# Patient Record
Sex: FEMALE | Race: BLACK OR AFRICAN AMERICAN | NOT HISPANIC OR LATINO
[De-identification: names, ages, dates, MRNs, and addresses within clinical notes are randomized per-mention and may not be internally consistent; named-entity substitution may affect disease eponyms.]

---

## 2018-01-08 ENCOUNTER — RESULT REVIEW (OUTPATIENT)
Age: 33
End: 2018-01-08

## 2018-01-31 ENCOUNTER — RESULT REVIEW (OUTPATIENT)
Age: 33
End: 2018-01-31

## 2018-11-14 ENCOUNTER — EMERGENCY (EMERGENCY)
Facility: HOSPITAL | Age: 33
LOS: 1 days | Discharge: HOME | End: 2018-11-14
Attending: EMERGENCY MEDICINE
Payer: COMMERCIAL

## 2018-11-14 VITALS
RESPIRATION RATE: 20 BRPM | DIASTOLIC BLOOD PRESSURE: 70 MMHG | SYSTOLIC BLOOD PRESSURE: 115 MMHG | HEART RATE: 66 BPM | OXYGEN SATURATION: 99 %

## 2018-11-14 VITALS
OXYGEN SATURATION: 100 % | RESPIRATION RATE: 16 BRPM | DIASTOLIC BLOOD PRESSURE: 67 MMHG | SYSTOLIC BLOOD PRESSURE: 113 MMHG | TEMPERATURE: 98 F | HEART RATE: 69 BPM

## 2018-11-14 DIAGNOSIS — N83.209 UNSPECIFIED OVARIAN CYST, UNSPECIFIED SIDE: ICD-10-CM

## 2018-11-14 DIAGNOSIS — Z34.90 ENCOUNTER FOR SUPERVISION OF NORMAL PREGNANCY, UNSPECIFIED, UNSPECIFIED TRIMESTER: ICD-10-CM

## 2018-11-14 LAB
ALBUMIN SERPL ELPH-MCNC: 4 G/DL — SIGNIFICANT CHANGE UP (ref 3.5–5)
ALP SERPL-CCNC: 39 U/L — LOW (ref 40–120)
ALT FLD-CCNC: 16 U/L DA — SIGNIFICANT CHANGE UP (ref 10–60)
ANION GAP SERPL CALC-SCNC: 8 MMOL/L — SIGNIFICANT CHANGE UP (ref 5–17)
APPEARANCE UR: CLEAR — SIGNIFICANT CHANGE UP
APTT BLD: 26.9 SEC — LOW (ref 27.5–36.3)
AST SERPL-CCNC: 16 U/L — SIGNIFICANT CHANGE UP (ref 10–40)
BASOPHILS # BLD AUTO: 0.1 K/UL — SIGNIFICANT CHANGE UP (ref 0–0.2)
BASOPHILS NFR BLD AUTO: 1.1 % — SIGNIFICANT CHANGE UP (ref 0–2)
BILIRUB SERPL-MCNC: 0.2 MG/DL — SIGNIFICANT CHANGE UP (ref 0.2–1.2)
BILIRUB UR-MCNC: NEGATIVE — SIGNIFICANT CHANGE UP
BUN SERPL-MCNC: 12 MG/DL — SIGNIFICANT CHANGE UP (ref 7–18)
CALCIUM SERPL-MCNC: 8.8 MG/DL — SIGNIFICANT CHANGE UP (ref 8.4–10.5)
CHLORIDE SERPL-SCNC: 107 MMOL/L — SIGNIFICANT CHANGE UP (ref 96–108)
CO2 SERPL-SCNC: 24 MMOL/L — SIGNIFICANT CHANGE UP (ref 22–31)
COLOR SPEC: YELLOW — SIGNIFICANT CHANGE UP
CREAT SERPL-MCNC: 0.7 MG/DL — SIGNIFICANT CHANGE UP (ref 0.5–1.3)
DIFF PNL FLD: NEGATIVE — SIGNIFICANT CHANGE UP
EOSINOPHIL # BLD AUTO: 0.1 K/UL — SIGNIFICANT CHANGE UP (ref 0–0.5)
EOSINOPHIL NFR BLD AUTO: 1.4 % — SIGNIFICANT CHANGE UP (ref 0–6)
GLUCOSE SERPL-MCNC: 79 MG/DL — SIGNIFICANT CHANGE UP (ref 70–99)
GLUCOSE UR QL: NEGATIVE — SIGNIFICANT CHANGE UP
HCG SERPL-ACNC: 1314 MIU/ML — HIGH
HCT VFR BLD CALC: 34.8 % — SIGNIFICANT CHANGE UP (ref 34.5–45)
HGB BLD-MCNC: 11 G/DL — LOW (ref 11.5–15.5)
INR BLD: 1.11 RATIO — SIGNIFICANT CHANGE UP (ref 0.88–1.16)
KETONES UR-MCNC: NEGATIVE — SIGNIFICANT CHANGE UP
LEUKOCYTE ESTERASE UR-ACNC: ABNORMAL
LYMPHOCYTES # BLD AUTO: 2.2 K/UL — SIGNIFICANT CHANGE UP (ref 1–3.3)
LYMPHOCYTES # BLD AUTO: 36.6 % — SIGNIFICANT CHANGE UP (ref 13–44)
MCHC RBC-ENTMCNC: 24.8 PG — LOW (ref 27–34)
MCHC RBC-ENTMCNC: 31.7 GM/DL — LOW (ref 32–36)
MCV RBC AUTO: 78 FL — LOW (ref 80–100)
MONOCYTES # BLD AUTO: 0.4 K/UL — SIGNIFICANT CHANGE UP (ref 0–0.9)
MONOCYTES NFR BLD AUTO: 6 % — SIGNIFICANT CHANGE UP (ref 2–14)
NEUTROPHILS # BLD AUTO: 3.3 K/UL — SIGNIFICANT CHANGE UP (ref 1.8–7.4)
NEUTROPHILS NFR BLD AUTO: 54.9 % — SIGNIFICANT CHANGE UP (ref 43–77)
NITRITE UR-MCNC: NEGATIVE — SIGNIFICANT CHANGE UP
PH UR: 5 — SIGNIFICANT CHANGE UP (ref 5–8)
PLATELET # BLD AUTO: 261 K/UL — SIGNIFICANT CHANGE UP (ref 150–400)
POTASSIUM SERPL-MCNC: 4.1 MMOL/L — SIGNIFICANT CHANGE UP (ref 3.5–5.3)
POTASSIUM SERPL-SCNC: 4.1 MMOL/L — SIGNIFICANT CHANGE UP (ref 3.5–5.3)
PROT SERPL-MCNC: 8.1 G/DL — SIGNIFICANT CHANGE UP (ref 6–8.3)
PROT UR-MCNC: 15
PROTHROM AB SERPL-ACNC: 12.4 SEC — SIGNIFICANT CHANGE UP (ref 10–12.9)
RBC # BLD: 4.46 M/UL — SIGNIFICANT CHANGE UP (ref 3.8–5.2)
RBC # FLD: 13.4 % — SIGNIFICANT CHANGE UP (ref 10.3–14.5)
SODIUM SERPL-SCNC: 139 MMOL/L — SIGNIFICANT CHANGE UP (ref 135–145)
SP GR SPEC: 1.02 — SIGNIFICANT CHANGE UP (ref 1.01–1.02)
UROBILINOGEN FLD QL: NEGATIVE — SIGNIFICANT CHANGE UP
WBC # BLD: 6.1 K/UL — SIGNIFICANT CHANGE UP (ref 3.8–10.5)
WBC # FLD AUTO: 6.1 K/UL — SIGNIFICANT CHANGE UP (ref 3.8–10.5)

## 2018-11-14 PROCEDURE — 81001 URINALYSIS AUTO W/SCOPE: CPT

## 2018-11-14 PROCEDURE — 99285 EMERGENCY DEPT VISIT HI MDM: CPT

## 2018-11-14 PROCEDURE — 99284 EMERGENCY DEPT VISIT MOD MDM: CPT | Mod: 25

## 2018-11-14 PROCEDURE — 76801 OB US < 14 WKS SINGLE FETUS: CPT

## 2018-11-14 PROCEDURE — 86850 RBC ANTIBODY SCREEN: CPT

## 2018-11-14 PROCEDURE — 85027 COMPLETE CBC AUTOMATED: CPT

## 2018-11-14 PROCEDURE — 76817 TRANSVAGINAL US OBSTETRIC: CPT | Mod: 26

## 2018-11-14 PROCEDURE — 86901 BLOOD TYPING SEROLOGIC RH(D): CPT

## 2018-11-14 PROCEDURE — 36415 COLL VENOUS BLD VENIPUNCTURE: CPT

## 2018-11-14 PROCEDURE — 76817 TRANSVAGINAL US OBSTETRIC: CPT

## 2018-11-14 PROCEDURE — 80053 COMPREHEN METABOLIC PANEL: CPT

## 2018-11-14 PROCEDURE — 85610 PROTHROMBIN TIME: CPT

## 2018-11-14 PROCEDURE — 84702 CHORIONIC GONADOTROPIN TEST: CPT

## 2018-11-14 PROCEDURE — 76801 OB US < 14 WKS SINGLE FETUS: CPT | Mod: 26

## 2018-11-14 PROCEDURE — 86900 BLOOD TYPING SEROLOGIC ABO: CPT

## 2018-11-14 PROCEDURE — 85730 THROMBOPLASTIN TIME PARTIAL: CPT

## 2018-11-14 NOTE — ED PROVIDER NOTE - OBJECTIVE STATEMENT
A4, currently pregnant 31 y/o F pt with no significant PMHx and no significant PSHx presents to ED c/o intermittent RLQ pain x5 days. Pt notes associated nausea. Per pt, she found out 2 days ago that she was pregnant; unknown LMP. Pt reports that the RLQ pain does not occur in the morning and afternoon but does occur in the evening. Pt states that she hasn't taken any medication for the pain. Pt denies fever, chills, vomiting, vaginal bleeding, urinary sx's, or any other complaints. NKDA.

## 2018-11-14 NOTE — CONSULT NOTE ADULT - ASSESSMENT
a/p 33 y/o  BHCG 1314 with right ovarian cyst, early gestation, stable  dc home  f/u with Dr. Rogers in 48hrs for repeat BHCG  tylenol prn for pain  return to ER if symptoms worsen  dw Dr. Rogers

## 2018-11-14 NOTE — ED STATDOCS - OBJECTIVE STATEMENT
Telemedicine assessment was conducted (using real time 2 way audio-video technology) by Dr. Kwabena Cornejo located at [address] [place] [address]  +++++++++++++++++++++++++++++++++++++++++++++++++++  History and Plan: 32  y/o F with no significant PMHx or PSHx presents to the ED with complaints of lower abdominal pain. Patient had recent positive pregnancy test 2 days ago. Patient denies vaginal bleeding, vaginal discharge or any other complaints. Patient is unsure of LNMP. NKDA.   Plan: Will obtain blood work and pelvic ultrasound. Patient seen by me in intake for initial assessment and ordering. Physician on site to follow results and further evaluate and treat patient.

## 2018-11-14 NOTE — CONSULT NOTE ADULT - PROBLEM SELECTOR RECOMMENDATION 9
a/p 31 y/o  BHCG 1314 with right ovarian cyst, early gestation, stable  dc home  f/u with Dr. Rogers in 48hrs for repeat BHCG  tylenol prn for pain  return to ER if symptoms worsen  dw Dr. Rogers

## 2018-11-14 NOTE — CONSULT NOTE ADULT - SUBJECTIVE AND OBJECTIVE BOX
GYN PA Consult Note    31 y/o  presented with c/o RLQ pain x5days, 5/10, non radiating, no alleviating or aggravating factors. LMP unknown had sab in April, bleeding on and off. Patient denies vaginal bleeding, dizziness, syncope, n/v. Patient just found out she is pregnant.  pobx 2018 complete sab  missed Ab@12weeks    x2 uncomplicated   girl 6pounds   boy 7.4pounds   top @19yrs old  gynx fibrocystic breast disease, h/o abnormal PAP f/u wnl  pmhx denies  psx right breast lumpectomy   markers placed   meds denies   allergies nuts    Vital Signs Last 24 Hrs  T(C): 36.4 (2018 16:17), Max: 36.4 (2018 16:17)  T(F): 97.6 (2018 16:17), Max: 97.6 (2018 16:17)  HR: 69 (2018 16:17) (69 - 69)  BP: 113/67 (2018 16:17) (113/67 - 113/67)  BP(mean): --  RR: 16 (2018 16:17) (16 - 16)  SpO2: 100% (2018 16:17) (100% - 100%)    gen aox3, not in acute distress, non toxic appearing  abd soft, non distended, mild tenderness in RLQ, no guarding, no rebound  bimanual no CMT, no adnexa tenderness b/l                          11.0   6.1   )-----------( 261      ( 2018 16:58 )             34.8       HCG Quantitative, Serum (11.14.18 @ 16:58)    HCG Quantitative, Serum: 1314: HCG-Quantitative test interpretations: This test is intended only for the  detection & monitoring of pregnancy. For oncology studies order the tumor  marker test “HCG-TM” (hCG-Tumor Marker).  For pregnancy evaluation the reference values are as follows:  Negative:  <=4 mIU/mL  Indeterminate:  5 - 25 mIU/mL (suggest repeat testing in 72 hours)  Positive:  > 25 mIU/mL  Reference Values during Pregnancy:  Weeks after LMP* REFERENCE INTERVAL mIU/mL     3      6 - 71     4      10 - 750     5      220 - 7,100     6      160 - 32,000     7      3,700 - 164,000     8      32,000 - 150,000     9      64,000 - 151,000     10      47,000 - 187,000     12      28,000 - 211,000     14      14,000 - 63,000     15      12,000 - 71,000     16 - 18  8,000 - 58,000  * LMP:  Last Menstrual Period  HCG results of false positive and false negative for pregnancy are rare,  but can occur with this, and other, hCG tests. [place]- and post-menopausal  females may have mildly elevated hCG concentrations usually less than 14  mIU/mL that are constant over time. mIU/mL        < from: US Echo Transvaginal, OB ([id number] @ 18:21) >  Multiple transverse and longitudinal sonographic images of the pelvis   were obtained utilizing transabdominal and endovaginal technique.    The uterus measures 8.7 x 5.8 x 4.4 cm. The endometrium measures 16 mm.   There is no sonographic visualization of an intrauterine gestation.    The ovaries measure 5.6 x 5.1 x 3.9 cm on the right (enlarged) and 3.5 x   1.6 x 1.4 cm on the left; the left ovarian parenchyma appears   unremarkable with blood flow identified within the left ovary. There is a   4.1 x 3.5 x 3.0 cm complex cystic lesion identified within the right   ovarian parenchyma. Blood flow is identified within the right ovarian   parenchyma. A small to moderate volume of free fluid is identified within   thepelvic region.    IMPRESSION:  1. No sonographic visualization of an intrauterine gestation in this   patient with a positive pregnancy test. Ectopic pregnancy cannot be   excluded. Correlation with serial hCG evaluation and follow-up sonography   recommended.  2. There is a 4.1 x 3.5 x 3.0 cm complex cystic lesion identified within   the right ovarian parenchyma. Follow-up pelvic ultrasonography in 6 weeks   suggested for reevaluation.    < end of copied text >

## 2018-11-14 NOTE — ED PROVIDER NOTE - MEDICAL DECISION MAKING DETAILS
33 y/o F pt presents to ED with RLQ pain x5 days, currently pregnant with mild tenderness to RLQ. Low suspicion for appendicitis; plan labs, UA, US r/o ectopic pregnancy and reassess.

## 2018-11-14 NOTE — ED PROVIDER NOTE - CARE PLAN
Principal Discharge DX:	Cyst of right ovary  Secondary Diagnosis:	Abdominal pain in pregnancy, first trimester

## 2018-11-14 NOTE — ED PROVIDER NOTE - ATTENDING CONTRIBUTION TO CARE
I conducted a face-to-face interaction with patient and I agree with NP documentation and plan.  Pt presents after found out home pregnancy test positive, and c/o 5 days of RLQ/pelvic pain, no vaginal bleeding  Exam:  General: NAD;  Abd: soft, mild RLQ/R pelvic tenderness, no rebound/guarding  A/P:  Serum beta hCG 1314, no IUP on US, OB consulted by advised discharge and f/u instructed; given overall clinical picture, exam, and protracted time course, appendicitis unlikely.

## 2018-11-14 NOTE — ED ADULT NURSE NOTE - CHIEF COMPLAINT QUOTE
pt sent from the pmd for rt lower abdominal pain for 1 week pt had the positive pregnancy test unkown LMP sent for r/o Ectopic pregnancy no vaginal discharge

## 2018-11-14 NOTE — ED ADULT NURSE NOTE - NSIMPLEMENTINTERV_GEN_ALL_ED
Implemented All Universal Safety Interventions:  [place] to call system. Call bell, personal items and telephone within reach. Instruct patient to call for assistance. Room bathroom lighting operational. Non-slip footwear when patient is off stretcher. Physically safe environment: no spills, clutter or unnecessary equipment. Stretcher in lowest position, wheels locked, appropriate side rails in place.

## 2018-11-14 NOTE — ED PROVIDER NOTE - PROGRESS NOTE DETAILS
Serum HCG 1314, US shows complex cystic lesion concerning for ectopic pregnancy, will consult with OB. OB consulted. They think it's a ovarian cyst. Will dc with follow up with Dr Platt in 2 days without fail. Strongly advised to come back if worsening pain, vag bleeding, vomiting, fever or any new or worsening symptoms or if concerned. Pt is well appearing walking with steady gait, stable for discharge and follow up without fail with medical doctor. I had a detailed discussion with the patient and/or guardian regarding the historical points, exam findings, and any diagnostic results supporting the discharge diagnosis. Pt educated on care and need for follow up. Strict return instructions and red flag signs and symptoms discussed with patient. Questions answered. Pt shows understanding of discharge information and agrees to follow.

## 2018-11-14 NOTE — ED ADULT TRIAGE NOTE - CHIEF COMPLAINT QUOTE
pt sent from the pmd for rt lower abdominal pain for 1 week pt had the positive pregnancy test unkown LMP pt sent from the pmd for rt lower abdominal pain for 1 week pt had the positive pregnancy test unkown LMP sent for r/o Ectopic pregnancy pt sent from the pmd for rt lower abdominal pain for 1 week pt had the positive pregnancy test unkown LMP sent for r/o Ectopic pregnancy no vaginal discharge

## 2018-11-14 NOTE — ED ADULT NURSE NOTE - OBJECTIVE STATEMENT
came in c/o lower abdominal pain denies any vaginal bleeding or spotting , just found out hat she is pregnant ,sent in by pmd to r/o ectopic pregnancy